# Patient Record
Sex: MALE | URBAN - METROPOLITAN AREA
[De-identification: names, ages, dates, MRNs, and addresses within clinical notes are randomized per-mention and may not be internally consistent; named-entity substitution may affect disease eponyms.]

---

## 2019-02-13 ENCOUNTER — OFFICE VISIT (OUTPATIENT)
Dept: INTERNAL MEDICINE CLINIC | Facility: CLINIC | Age: 34
End: 2019-02-13
Payer: COMMERCIAL

## 2019-02-13 VITALS
WEIGHT: 181 LBS | HEART RATE: 80 BPM | SYSTOLIC BLOOD PRESSURE: 144 MMHG | DIASTOLIC BLOOD PRESSURE: 90 MMHG | BODY MASS INDEX: 23.99 KG/M2 | RESPIRATION RATE: 16 BRPM | TEMPERATURE: 98 F | HEIGHT: 73 IN

## 2019-02-13 DIAGNOSIS — F32.1 CURRENT MODERATE EPISODE OF MAJOR DEPRESSIVE DISORDER WITHOUT PRIOR EPISODE (HCC): ICD-10-CM

## 2019-02-13 DIAGNOSIS — R03.0 ELEVATED BLOOD PRESSURE READING: ICD-10-CM

## 2019-02-13 DIAGNOSIS — Z00.00 ENCOUNTER FOR PREVENTATIVE ADULT HEALTH CARE EXAMINATION: Primary | ICD-10-CM

## 2019-02-13 DIAGNOSIS — H93.8X2 FULLNESS IN EAR, LEFT: ICD-10-CM

## 2019-02-13 PROCEDURE — 99385 PREV VISIT NEW AGE 18-39: CPT | Performed by: INTERNAL MEDICINE

## 2019-02-13 PROCEDURE — 96127 BRIEF EMOTIONAL/BEHAV ASSMT: CPT | Performed by: INTERNAL MEDICINE

## 2019-02-13 NOTE — PATIENT INSTRUCTIONS
- Get blood work done when fasting (at least 8 hours, water and medications only). - Our , Shiloh Ford, will reach up to you to set up an appointment  - For ear issues, start nasal steroid spray.   Over the counter options are fluticasone, mometason pancakes, muffins  Dairy  Ok: Milk, chocolate milk, hot chocolate mix, low-salt cheeses, and yogurt  Avoid: Processed cheese and cheese spreads;  Roquefort, Camembert, and cottage cheese; buttermilk, instant breakfast drink  Desserts  Ok: Ice cream, frozen professional medical care. Always follow your healthcare professional's instructions. It was a pleasure seeing you in the clinic today. Thank you for choosing the [facility] office for your healthcare needs.  Please call at [phone]

## 2019-02-13 NOTE — PROGRESS NOTES
Tiki Lucero is a 29year old male.    HPI:   Patient presents with:  New Patient: No previous PCP since age 25  Physical  Hearing Problem: Left ear hearing loss after an injury to his head  in December of 2018  Patient presents for CPX/wellness examin (01/2019). Surgical:  has no past surgical history on file.   Family: family history includes Cancer in his paternal grandfather; Cardiomegaly in his father; High Cholesterol in his father; Hypertension in his father; Rachel Blades in his paternal grandmothe depression/depressed symptoms. His girlfriend (expecting with their child) just left the state. Will have patient follow up with [facility] for therapy/counseling.  - [facility]    3. Elevated blood pressure reading  Borderline reading.   Monitor fo